# Patient Record
Sex: MALE | Race: WHITE | Employment: FULL TIME | URBAN - METROPOLITAN AREA
[De-identification: names, ages, dates, MRNs, and addresses within clinical notes are randomized per-mention and may not be internally consistent; named-entity substitution may affect disease eponyms.]

---

## 2018-11-08 ENCOUNTER — HOSPITAL ENCOUNTER (OUTPATIENT)
Age: 23
Discharge: HOME | End: 2018-11-08
Payer: MEDICAID

## 2018-11-08 LAB
CHOLESTEROL: 147 MG/DL
GLUCOSE FASTING: 74 MG/DL (ref 70–99)
HDLC SERPL-MCNC: 36 MG/DL
LDL CHOLESTEROL DIRECT: 97 MG/DL
TRIGL SERPL-MCNC: 115 MG/DL

## 2018-11-08 PROCEDURE — 83721 ASSAY OF BLOOD LIPOPROTEIN: CPT

## 2018-11-08 PROCEDURE — 82947 ASSAY GLUCOSE BLOOD QUANT: CPT

## 2018-11-08 PROCEDURE — 36415 COLL VENOUS BLD VENIPUNCTURE: CPT

## 2018-11-08 PROCEDURE — 80061 LIPID PANEL: CPT

## 2019-02-18 ENCOUNTER — HOSPITAL ENCOUNTER (EMERGENCY)
Age: 24
Discharge: PSYCHIATRIC OR SUBSTANCE USE FACILITY | End: 2019-02-19
Attending: EMERGENCY MEDICINE
Payer: MEDICAID

## 2019-02-18 DIAGNOSIS — F22 PARANOID BEHAVIOR (HCC): Primary | ICD-10-CM

## 2019-02-18 DIAGNOSIS — Z86.59 HISTORY OF SCHIZOPHRENIA: ICD-10-CM

## 2019-02-18 LAB
ACETAMINOPHEN LEVEL: <5 UG/ML (ref 15–30)
ALBUMIN SERPL-MCNC: 4 GM/DL (ref 3.4–5)
ALCOHOL SCREEN SERUM: <0.01 %WT/VOL
ALP BLD-CCNC: 68 IU/L (ref 40–129)
ALT SERPL-CCNC: 18 U/L (ref 10–40)
AMPHETAMINES: NEGATIVE
ANION GAP SERPL CALCULATED.3IONS-SCNC: 12 MMOL/L (ref 4–16)
AST SERPL-CCNC: 19 IU/L (ref 15–37)
BACTERIA: NEGATIVE /HPF
BARBITURATE SCREEN URINE: NEGATIVE
BASOPHILS ABSOLUTE: 0.1 K/CU MM
BASOPHILS RELATIVE PERCENT: 0.6 % (ref 0–1)
BENZODIAZEPINE SCREEN, URINE: NEGATIVE
BILIRUB SERPL-MCNC: 0.5 MG/DL (ref 0–1)
BILIRUBIN URINE: NEGATIVE MG/DL
BLOOD, URINE: ABNORMAL
BUN BLDV-MCNC: 9 MG/DL (ref 6–23)
CALCIUM SERPL-MCNC: 8.9 MG/DL (ref 8.3–10.6)
CANNABINOID SCREEN URINE: NEGATIVE
CHLORIDE BLD-SCNC: 101 MMOL/L (ref 99–110)
CLARITY: CLEAR
CO2: 27 MMOL/L (ref 21–32)
COCAINE METABOLITE: NEGATIVE
COLOR: YELLOW
CREAT SERPL-MCNC: 1.2 MG/DL (ref 0.9–1.3)
DIFFERENTIAL TYPE: ABNORMAL
EOSINOPHILS ABSOLUTE: 0.4 K/CU MM
EOSINOPHILS RELATIVE PERCENT: 2.8 % (ref 0–3)
GFR AFRICAN AMERICAN: >60 ML/MIN/1.73M2
GFR NON-AFRICAN AMERICAN: >60 ML/MIN/1.73M2
GLUCOSE BLD-MCNC: 99 MG/DL (ref 70–99)
GLUCOSE, URINE: NEGATIVE MG/DL
HCT VFR BLD CALC: 52.3 % (ref 42–52)
HEMOGLOBIN: 17.3 GM/DL (ref 13.5–18)
IMMATURE NEUTROPHIL %: 0.6 % (ref 0–0.43)
KETONES, URINE: NEGATIVE MG/DL
LEUKOCYTE ESTERASE, URINE: NEGATIVE
LYMPHOCYTES ABSOLUTE: 3.6 K/CU MM
LYMPHOCYTES RELATIVE PERCENT: 26.2 % (ref 24–44)
MCH RBC QN AUTO: 33.4 PG (ref 27–31)
MCHC RBC AUTO-ENTMCNC: 33.1 % (ref 32–36)
MCV RBC AUTO: 101 FL (ref 78–100)
MONOCYTES ABSOLUTE: 1 K/CU MM
MONOCYTES RELATIVE PERCENT: 7.3 % (ref 0–4)
MUCUS: ABNORMAL HPF
NITRITE URINE, QUANTITATIVE: NEGATIVE
NUCLEATED RBC %: 0 %
OPIATES, URINE: NEGATIVE
OXYCODONE: NEGATIVE
PDW BLD-RTO: 12.8 % (ref 11.7–14.9)
PH, URINE: 7 (ref 5–8)
PHENCYCLIDINE, URINE: NEGATIVE
PLATELET # BLD: 317 K/CU MM (ref 140–440)
PMV BLD AUTO: 9.8 FL (ref 7.5–11.1)
POTASSIUM SERPL-SCNC: 4.1 MMOL/L (ref 3.5–5.1)
PROTEIN UA: NEGATIVE MG/DL
RBC # BLD: 5.18 M/CU MM (ref 4.6–6.2)
RBC URINE: <1 /HPF (ref 0–3)
SALICYLATE LEVEL: <0.3 MG/DL (ref 15–30)
SEGMENTED NEUTROPHILS ABSOLUTE COUNT: 8.7 K/CU MM
SEGMENTED NEUTROPHILS RELATIVE PERCENT: 62.5 % (ref 36–66)
SODIUM BLD-SCNC: 140 MMOL/L (ref 135–145)
SPECIFIC GRAVITY UA: 1.01 (ref 1–1.03)
TOTAL IMMATURE NEUTOROPHIL: 0.09 K/CU MM
TOTAL NUCLEATED RBC: 0 K/CU MM
TOTAL PROTEIN: 7.1 GM/DL (ref 6.4–8.2)
TRICHOMONAS: ABNORMAL /HPF
TSH HIGH SENSITIVITY: 1.93 UIU/ML (ref 0.27–4.2)
UROBILINOGEN, URINE: 1 MG/DL (ref 0.2–1)
WBC # BLD: 13.9 K/CU MM (ref 4–10.5)
WBC UA: <1 /HPF (ref 0–2)

## 2019-02-18 PROCEDURE — G0480 DRUG TEST DEF 1-7 CLASSES: HCPCS

## 2019-02-18 PROCEDURE — 36415 COLL VENOUS BLD VENIPUNCTURE: CPT

## 2019-02-18 PROCEDURE — 99285 EMERGENCY DEPT VISIT HI MDM: CPT

## 2019-02-18 PROCEDURE — 81001 URINALYSIS AUTO W/SCOPE: CPT

## 2019-02-18 PROCEDURE — 80307 DRUG TEST PRSMV CHEM ANLYZR: CPT

## 2019-02-18 PROCEDURE — 85025 COMPLETE CBC W/AUTO DIFF WBC: CPT

## 2019-02-18 PROCEDURE — 84443 ASSAY THYROID STIM HORMONE: CPT

## 2019-02-18 PROCEDURE — 80053 COMPREHEN METABOLIC PANEL: CPT

## 2019-02-18 ASSESSMENT — PAIN DESCRIPTION - PAIN TYPE: TYPE: CHRONIC PAIN

## 2019-02-18 ASSESSMENT — PAIN DESCRIPTION - LOCATION: LOCATION: BACK

## 2019-02-18 ASSESSMENT — PAIN SCALES - GENERAL: PAINLEVEL_OUTOF10: 4

## 2019-02-19 VITALS
WEIGHT: 240 LBS | HEIGHT: 67 IN | SYSTOLIC BLOOD PRESSURE: 140 MMHG | RESPIRATION RATE: 16 BRPM | DIASTOLIC BLOOD PRESSURE: 75 MMHG | TEMPERATURE: 98.1 F | BODY MASS INDEX: 37.67 KG/M2 | OXYGEN SATURATION: 98 % | HEART RATE: 91 BPM

## 2019-02-19 ASSESSMENT — SLEEP AND FATIGUE QUESTIONNAIRES
DO YOU USE A SLEEP AID: COMMENT
AVERAGE NUMBER OF SLEEP HOURS: 5
DIFFICULTY STAYING ASLEEP: YES
DIFFICULTY ARISING: NO
SLEEP PATTERN: DIFFICULTY FALLING ASLEEP
DO YOU HAVE DIFFICULTY SLEEPING: YES
DIFFICULTY FALLING ASLEEP: NO
RESTFUL SLEEP: YES

## 2019-02-19 ASSESSMENT — PATIENT HEALTH QUESTIONNAIRE - PHQ9: SUM OF ALL RESPONSES TO PHQ QUESTIONS 1-9: 6

## 2020-03-12 ENCOUNTER — APPOINTMENT (OUTPATIENT)
Dept: GENERAL RADIOLOGY | Age: 25
End: 2020-03-12
Payer: MEDICAID

## 2020-03-12 ENCOUNTER — HOSPITAL ENCOUNTER (EMERGENCY)
Age: 25
Discharge: HOME | End: 2020-03-12
Attending: EMERGENCY MEDICINE
Payer: MEDICAID

## 2020-03-12 VITALS
OXYGEN SATURATION: 98 % | HEART RATE: 85 BPM | BODY MASS INDEX: 36.88 KG/M2 | SYSTOLIC BLOOD PRESSURE: 130 MMHG | TEMPERATURE: 98.2 F | RESPIRATION RATE: 15 BRPM | HEIGHT: 69 IN | WEIGHT: 249 LBS | DIASTOLIC BLOOD PRESSURE: 86 MMHG

## 2020-03-12 LAB
ACETAMINOPHEN LEVEL: <5 UG/ML (ref 15–30)
ALBUMIN SERPL-MCNC: 4.7 GM/DL (ref 3.4–5)
ALCOHOL SCREEN SERUM: NORMAL %WT/VOL
ALP BLD-CCNC: 81 IU/L (ref 40–129)
ALT SERPL-CCNC: 22 U/L (ref 10–40)
AMPHETAMINES: NEGATIVE
ANION GAP SERPL CALCULATED.3IONS-SCNC: 11 MMOL/L (ref 4–16)
AST SERPL-CCNC: 18 IU/L (ref 15–37)
BACTERIA: NEGATIVE /HPF
BARBITURATE SCREEN URINE: NEGATIVE
BASOPHILS ABSOLUTE: 0.1 K/CU MM
BASOPHILS RELATIVE PERCENT: 0.5 % (ref 0–1)
BENZODIAZEPINE SCREEN, URINE: NEGATIVE
BILIRUB SERPL-MCNC: 0.5 MG/DL (ref 0–1)
BILIRUBIN URINE: NEGATIVE MG/DL
BLOOD, URINE: ABNORMAL
BUN BLDV-MCNC: 10 MG/DL (ref 6–23)
CALCIUM SERPL-MCNC: 9.9 MG/DL (ref 8.3–10.6)
CANNABINOID SCREEN URINE: NEGATIVE
CHLORIDE BLD-SCNC: 101 MMOL/L (ref 99–110)
CLARITY: ABNORMAL
CO2: 26 MMOL/L (ref 21–32)
COCAINE METABOLITE: NEGATIVE
COLOR: ABNORMAL
CREAT SERPL-MCNC: 1 MG/DL (ref 0.9–1.3)
DIFFERENTIAL TYPE: ABNORMAL
DOSE AMOUNT: ABNORMAL
DOSE AMOUNT: ABNORMAL
DOSE TIME: ABNORMAL
DOSE TIME: ABNORMAL
EOSINOPHILS ABSOLUTE: 0.2 K/CU MM
EOSINOPHILS RELATIVE PERCENT: 0.9 % (ref 0–3)
GFR AFRICAN AMERICAN: >60 ML/MIN/1.73M2
GFR NON-AFRICAN AMERICAN: >60 ML/MIN/1.73M2
GLUCOSE BLD-MCNC: 97 MG/DL (ref 70–99)
GLUCOSE, URINE: NEGATIVE MG/DL
HCT VFR BLD CALC: 55 % (ref 42–52)
HEMOGLOBIN: 18.4 GM/DL (ref 13.5–18)
IMMATURE NEUTROPHIL %: 0.4 % (ref 0–0.43)
KETONES, URINE: ABNORMAL MG/DL
LEUKOCYTE ESTERASE, URINE: NEGATIVE
LYMPHOCYTES ABSOLUTE: 2.2 K/CU MM
LYMPHOCYTES RELATIVE PERCENT: 14 % (ref 24–44)
MCH RBC QN AUTO: 32.7 PG (ref 27–31)
MCHC RBC AUTO-ENTMCNC: 33.5 % (ref 32–36)
MCV RBC AUTO: 97.9 FL (ref 78–100)
MONOCYTES ABSOLUTE: 0.7 K/CU MM
MONOCYTES RELATIVE PERCENT: 4.6 % (ref 0–4)
MUCUS: ABNORMAL HPF
NITRITE URINE, QUANTITATIVE: NEGATIVE
NUCLEATED RBC %: 0 %
OPIATES, URINE: NEGATIVE
OXYCODONE: NORMAL
PDW BLD-RTO: 12.5 % (ref 11.7–14.9)
PH, URINE: 5 (ref 5–8)
PHENCYCLIDINE, URINE: NEGATIVE
PLATELET # BLD: 401 K/CU MM (ref 140–440)
PMV BLD AUTO: 10.1 FL (ref 7.5–11.1)
POTASSIUM SERPL-SCNC: 4.9 MMOL/L (ref 3.5–5.1)
PROTEIN UA: 30 MG/DL
RBC # BLD: 5.62 M/CU MM (ref 4.6–6.2)
RBC URINE: 6 /HPF (ref 0–3)
SALICYLATE LEVEL: <0.3 MG/DL (ref 15–30)
SEGMENTED NEUTROPHILS ABSOLUTE COUNT: 12.7 K/CU MM
SEGMENTED NEUTROPHILS RELATIVE PERCENT: 79.6 % (ref 36–66)
SODIUM BLD-SCNC: 138 MMOL/L (ref 135–145)
SPECIFIC GRAVITY UA: 1.03 (ref 1–1.03)
SQUAMOUS EPITHELIAL: <1 /HPF
TOTAL IMMATURE NEUTOROPHIL: 0.07 K/CU MM
TOTAL NUCLEATED RBC: 0 K/CU MM
TOTAL PROTEIN: 8.1 GM/DL (ref 6.4–8.2)
TRANSITIONAL EPITHELIAL: <1 /HPF
TRICHOMONAS: ABNORMAL /HPF
UROBILINOGEN, URINE: 1 MG/DL (ref 0.2–1)
WBC # BLD: 16 K/CU MM (ref 4–10.5)
WBC UA: 1 /HPF (ref 0–2)

## 2020-03-12 PROCEDURE — 81001 URINALYSIS AUTO W/SCOPE: CPT

## 2020-03-12 PROCEDURE — 80053 COMPREHEN METABOLIC PANEL: CPT

## 2020-03-12 PROCEDURE — 93005 ELECTROCARDIOGRAM TRACING: CPT | Performed by: EMERGENCY MEDICINE

## 2020-03-12 PROCEDURE — 99285 EMERGENCY DEPT VISIT HI MDM: CPT

## 2020-03-12 PROCEDURE — 85025 COMPLETE CBC W/AUTO DIFF WBC: CPT

## 2020-03-12 PROCEDURE — 71045 X-RAY EXAM CHEST 1 VIEW: CPT

## 2020-03-12 PROCEDURE — G0480 DRUG TEST DEF 1-7 CLASSES: HCPCS

## 2020-03-12 PROCEDURE — 80307 DRUG TEST PRSMV CHEM ANLYZR: CPT

## 2020-03-12 ASSESSMENT — ENCOUNTER SYMPTOMS
EYE DISCHARGE: 0
EYE PAIN: 0
NAUSEA: 0
VOMITING: 0
SORE THROAT: 0
BACK PAIN: 0
RHINORRHEA: 0
ABDOMINAL PAIN: 0
COUGH: 0
SHORTNESS OF BREATH: 0

## 2020-03-12 NOTE — ED TRIAGE NOTES
Pt stated he has had a history of mental health problems. Was on medications for it but stopped taking them awhile ago. He is having increased paranoid thoughts at this time.

## 2020-03-12 NOTE — ED NOTES
The pt stated, \"Now that I am out of the situation I was in, I feel much better. I feel a lot more comfortable and I don't feel scared anymore. \"     Galo Cargo  03/12/20 4294

## 2020-03-12 NOTE — ED PROVIDER NOTES
Genitourinary: Negative for dysuria and flank pain. Musculoskeletal: Negative for back pain and neck pain. Skin: Negative for pallor and wound. Neurological: Negative for dizziness, seizures, facial asymmetry, light-headedness, numbness and headaches. Psychiatric/Behavioral: Positive for hallucinations. Negative for confusion. The patient is nervous/anxious. Except as noted above the remainder of the review of systems was reviewed and negative. PAST MEDICAL HISTORY     Past Medical History:   Diagnosis Date    Schizo affective schizophrenia [facility])        Prior to Admission medications    Medication Sig Start Date End Date Taking? Authorizing Provider   ibuprofen (ADVIL;MOTRIN) 600 MG tablet Take 1 tablet by mouth every 6 hours as needed for Pain 12/4/15   Yovanny Spring,    acetaminophen-codeine (TYLENOL/CODEINE #3) 300-30 MG per tablet Take 1 tablet by mouth every 4 hours as needed for 20 doses. 2/23/13   Desi Talavera, DO        There is no problem list on file for this patient. SURGICAL HISTORY     History reviewed. No pertinent surgical history. CURRENT MEDICATIONS       Previous Medications    ACETAMINOPHEN-CODEINE (TYLENOL/CODEINE #3) 300-30 MG PER TABLET    Take 1 tablet by mouth every 4 hours as needed for 20 doses. IBUPROFEN (ADVIL;MOTRIN) 600 MG TABLET    Take 1 tablet by mouth every 6 hours as needed for Pain       ALLERGIES     Patient has no known allergies. FAMILY HISTORY     History reviewed. No pertinent family history.        SOCIAL HISTORY       Social History     Socioeconomic History    Marital status: Single     Spouse name: None    Number of children: None    Years of education: None    Highest education level: None   Occupational History    None   Social Needs    Financial resource strain: None    Food insecurity     Worry: None     Inability: None    Transportation needs     Medical: None     Non-medical: None   Tobacco Use    Smoking Chest wall: No tenderness. Abdominal:      Palpations: Abdomen is soft. Tenderness: There is no abdominal tenderness. There is no guarding. Comments: Abdomen soft, non-tender, non-peritoneal  No guarding on abdominal exam   Musculoskeletal:         General: No signs of injury. Right lower leg: No edema. Left lower leg: No edema. Lymphadenopathy:      Cervical: No cervical adenopathy. Skin:     General: Skin is warm. Capillary Refill: Capillary refill takes less than 2 seconds. Findings: No erythema, lesion or rash. Neurological:      General: No focal deficit present. Mental Status: He is alert and oriented to person, place, and time. Mental status is at baseline.          DIAGNOSTIC RESULTS     Labs Reviewed   CBC WITH AUTO DIFFERENTIAL - Abnormal; Notable for the following components:       Result Value    WBC 16.0 (*)     Hemoglobin 18.4 (*)     Hematocrit 55.0 (*)     MCH 32.7 (*)     Segs Relative 79.6 (*)     Lymphocytes % 14.0 (*)     Monocytes % 4.6 (*)     All other components within normal limits   URINALYSIS - Abnormal; Notable for the following components:    Color, UA TONI (*)     Clarity, UA HAZY (*)     Ketones, Urine SMALL (*)     Blood, Urine MODERATE (*)     Protein, UA 30 (*)     RBC, UA 6 (*)     Mucus, UA MODERATE (*)     All other components within normal limits   SALICYLATE LEVEL - Abnormal; Notable for the following components:    Salicylate Lvl <4.1 (*)     All other components within normal limits   ACETAMINOPHEN LEVEL - Abnormal; Notable for the following components:    Acetaminophen Level <5.0 (*)     All other components within normal limits   COMPREHENSIVE METABOLIC PANEL W/ REFLEX TO MG FOR LOW K   URINE DRUG SCREEN   ETHANOL          EKG: All EKG's are interpreted by the Emergency Department Physician who either signs or Co-signs this chart in the absence of a cardiologist.       EKG Interpretation    Interpreted by emergency department other than the patient: yes          CONSULTS:  IP CONSULT TO PSYCHOLOGY    PROCEDURES:  None performed unless otherwise noted below     Procedures        FINAL IMPRESSION      1. Delusions ([address]    2. Hematuria, unspecified type          DISPOSITION/PLAN   DISPOSITION        PATIENT REFERRED TO:  No follow-up provider specified. DISCHARGE MEDICATIONS:  New Prescriptions    No medications on file       ED Provider Disposition Time  DISPOSITION        The Patient was instructed to read the package inserts with any medication that was prescribed. Major potential reactions and medication interactions were discussed. The Patient understands that there are numerous possible adverse reactions not covered. The patient was also instructed to arrange follow-up with his or her primary care provider for review of any pending labwork or incidental findings on any radiology results that were obtained. All efforts were made to discuss any incidental findings that require further monitoring. Controlled Substances Monitoring:     No flowsheet data found.     (Please note that portions of this note were completed with a voice recognition program.  Efforts were made to edit the dictations but occasionally words are mis-transcribed.)    James Bui MD (electronically signed)  Attending Emergency Physician           James Bui MD  03/12/20 2792

## 2020-03-12 NOTE — ED NOTES
Pt sitting on side of bed. No acute distress noted. Green gown remains in place, 1:1 continuous monitoring maintained. Sitter at the bedside.  Will continue to monitor     Acosta Shay RN  03/12/20 8906

## 2020-03-12 NOTE — ED PROVIDER NOTES
Emergency Department Encounter  Location: [address] [place] [address]    Patient: Emma Cabrera  MRN: [id number]  : 1995  Date of evaluation: 3/12/2020  ED Provider: Norma Clement MD    Emma Cabrera was checked out to me by Dr. Obi Plummer. Please see his/her initial documentation for details of the patient's initial ED presentation, physical exam and completed studies. In brief, Emma Cabrera is a 27 y.o. male that presented to the emergency department with increased hallucinations after being off his medications. No medical sxs at this time. No SI or HI.      I have reviewed and interpreted all of the currently available lab results and diagnostics from this visit:  Results for orders placed or performed during the hospital encounter of 20   Comprehensive Metabolic Panel w/ Reflex to MG   Result Value Ref Range    Sodium 138 135 - 145 MMOL/L    Potassium 4.9 3.5 - 5.1 MMOL/L    Chloride 101 99 - 110 mMol/L    CO2 26 21 - 32 MMOL/L    BUN 10 6 - 23 MG/DL    CREATININE 1.0 0.9 - 1.3 MG/DL    Glucose 97 70 - 99 MG/DL    Calcium 9.9 8.3 - 10.6 MG/DL    Alb 4.7 3.4 - 5.0 GM/DL    Total Protein 8.1 6.4 - 8.2 GM/DL    Total Bilirubin 0.5 0.0 - 1.0 MG/DL    ALT 22 10 - 40 U/L    AST 18 15 - 37 IU/L    Alkaline Phosphatase 81 40 - 129 IU/L    GFR Non-African American >60 >60 mL/min/1.73m2    GFR African American >60 >60 mL/min/1.73m2    Anion Gap 11 4 - 16   CBC Auto Differential   Result Value Ref Range    WBC 16.0 (H) 4.0 - 10.5 K/CU MM    RBC 5.62 4.6 - 6.2 M/CU MM    Hemoglobin 18.4 (H) 13.5 - 18.0 GM/DL    Hematocrit 55.0 (H) 42 - 52 %    MCV 97.9 78 - 100 FL    MCH 32.7 (H) 27 - 31 PG    MCHC 33.5 32.0 - 36.0 %    RDW 12.5 11.7 - 14.9 %    Platelets 664 969 - 217 K/CU MM    MPV 10.1 7.5 - 11.1 FL    Differential Type AUTOMATED DIFFERENTIAL     Segs Relative 79.6 (H) 36 - 66 %    Lymphocytes % 14.0 (L) 24 - 44 %    Monocytes % 4.6 (H) 0 - 4 % Eosinophils % 0.9 0 - 3 %    Basophils % 0.5 0 - 1 %    Segs Absolute 12.7 K/CU MM    Lymphocytes Absolute 2.2 K/CU MM    Monocytes Absolute 0.7 K/CU MM    Eosinophils Absolute 0.2 K/CU MM    Basophils Absolute 0.1 K/CU MM    Nucleated RBC % 0.0 %    Total Nucleated RBC 0.0 K/CU MM    Total Immature Neutrophil 0.07 K/CU MM    Immature Neutrophil % 0.4 0 - 0.43 %   Urinalysis   Result Value Ref Range    Color, UA TONI (A) YELLOW    Clarity, UA HAZY (A) CLEAR    Glucose, Urine NEGATIVE NEGATIVE MG/DL    Bilirubin Urine NEGATIVE NEGATIVE MG/DL    Ketones, Urine SMALL (A) NEGATIVE MG/DL    Specific Gravity, UA 1.029 1.001 - 1.035    Blood, Urine MODERATE (A) NEGATIVE    pH, Urine 5.0 5.0 - 8.0    Protein, UA 30 (A) NEGATIVE MG/DL    Urobilinogen, Urine 1 0.2 - 1.0 MG/DL    Nitrite Urine, Quantitative NEGATIVE NEGATIVE    Leukocyte Esterase, Urine NEGATIVE NEGATIVE    RBC, UA 6 (H) 0 - 3 /HPF    WBC, UA 1 0 - 2 /HPF    Bacteria, UA NEGATIVE NEGATIVE /HPF    Squam Epithel, UA <1 /HPF    Trans Epithel, UA <1 /HPF    Mucus, UA MODERATE (A) NEGATIVE HPF    Trichomonas, UA NONE SEEN NONE SEEN /HPF   Drug screen multi urine   Result Value Ref Range    Cannabinoid Scrn, Ur NEGATIVE NEGATIVE    Amphetamines NEGATIVE NEGATIVE    Cocaine Metabolite NEGATIVE NEGATIVE    Benzodiazepine Screen, Urine NEGATIVE NEGATIVE    Barbiturate Screen, Ur NEGATIVE NEGATIVE    Opiates, Urine NEGATIVE NEGATIVE    Phencyclidine, Urine NEGATIVE NEGATIVE    Oxycodone  NEGATIVE     NEGATIVE          THRESHOLD CONCENTRATIONS (mg/dL)  AMPHT               1000  PATY,OPIA             300  BZO,BAR              200  PCP                   25  THC                   50  OXY                  100          IF POSITIVE, SPECIMEN WILL BE  DISCARDED AFTER 6 MONTHS. CALL LAB IF CONFIRMATION NEEDED. ALL NEGATIVE SPECIMENS WILL BE  DISCARDED AFTER ONE WEEK. * UNCONFIRMED POSITIVES MAY  NOT MEET FORENSIC REQUIREMENTS.              Salicylate Level   Result Value Ref to bring the patient back if she notices any worsening. Patient is organized at this time and safe for discharge. ED Medication Orders (From admission, onward)    None          Final Impression      1. Delusions ([address]    2.  Hematuria, unspecified type        DISPOSITION Decision To Discharge 03/12/2020 07:51:32 PM     (Please note that portions of this note may have been completed with a voice recognition program. Efforts were made to edit the dictations but occasionally words are mis-transcribed.)    Dulce Osborne MD  [id number] Jai Brewster MD  03/12/20 0272

## 2020-03-13 ENCOUNTER — HOSPITAL ENCOUNTER (EMERGENCY)
Age: 25
Discharge: HOME | End: 2020-03-13
Attending: EMERGENCY MEDICINE
Payer: MEDICAID

## 2020-03-13 VITALS
HEART RATE: 118 BPM | SYSTOLIC BLOOD PRESSURE: 151 MMHG | DIASTOLIC BLOOD PRESSURE: 97 MMHG | OXYGEN SATURATION: 95 % | RESPIRATION RATE: 18 BRPM | TEMPERATURE: 98.4 F | BODY MASS INDEX: 35.55 KG/M2 | WEIGHT: 240 LBS | HEIGHT: 69 IN

## 2020-03-13 PROCEDURE — 99284 EMERGENCY DEPT VISIT MOD MDM: CPT

## 2020-03-13 PROCEDURE — 93010 ELECTROCARDIOGRAM REPORT: CPT | Performed by: INTERNAL MEDICINE

## 2020-03-13 NOTE — ED NOTES
Discharge instructions reviewed. All questions answered to pt and family satisfaction. Pt alert, oriented, and ambulatory upon discharge.       Rae Grover RN  03/12/20 2045

## 2020-03-13 NOTE — ED PROVIDER NOTES
EMERGENCY DEPARTMENT ENCOUNTER      PCP: Meaghan Dowell MD    CHIEF COMPLAINT    Chief Complaint   Patient presents with    Paranoid       Of note, this patient was also evaluated by the attending physician, Dr. Loraine Portillo. HPI    Haven Swanson is a 27 y.o. male Pt reports being paranoid and was in ED yesterday for same thing and was advised to follow up outpatient. Pt states thinking thoughts would improve however felt worse and was over-thinking all night unable to sleep. Denies suicidal or homicidal ideation. He said that he just feels nervous about going to mental health. He states he wants to get back on his medication. REVIEW OF SYSTEMS    Constitutional:  Denies fever, chills, weight loss or weakness   HENT:  Denies sore throat or ear pain   Cardiovascular:  Denies chest pain, palpitations   Respiratory:  Denies cough or shortness of breath    GI:  Denies abdominal pain, nausea, vomiting, or diarrhea  :  Denies any urinary symptoms  Skin:  Denies rash  Neurologic:  Denies headache, focal weakness or sensory changes   Endocrine:  Denies polyuria or polydypsia   Lymphatic:  Denies swollen glands     All other review of systems are negative  See HPI and nursing notes for additional information     PAST MEDICAL AND SURGICAL HISTORY    Past Medical History:   Diagnosis Date    Schizo affective schizophrenia ([facility]      History reviewed. No pertinent surgical history. CURRENT MEDICATIONS    Current Outpatient Rx   Medication Sig Dispense Refill    ibuprofen (ADVIL;MOTRIN) 600 MG tablet Take 1 tablet by mouth every 6 hours as needed for Pain 30 tablet 0    acetaminophen-codeine (TYLENOL/CODEINE #3) 300-30 MG per tablet Take 1 tablet by mouth every 4 hours as needed for 20 doses.  20 tablet 0       ALLERGIES    No Known Allergies    SOCIAL AND FAMILY HISTORY    Social History     Socioeconomic History    Marital status: Single     Spouse name: None    Number of children: None    Years of lower extremities  Bilateral upper and lower extremity ROM intact without pain or obvious deficit  Integument:  Warm, Dry  Neurologic: Alert & oriented , No focal deficits noted. Cranial nerves II through XII grossly intact. Normal gross motor coordination & motor strength bilateral upper and lower extremities  Sensation intact. Psychiatric:  Affect normal, Mood normal.       Labs:        EKG        RADIOLOGY            ED COURSE & MEDICAL DECISION MAKING       Patient remained in stable condition throughout his ED course. I discussed the case with Dr. Marcelle Sargent who seen the patient yesterday for the same thing she agreed with therapeutic modalities and discharge plan. Patient expressed no suicidal ideation or homicidal ideation his mood was calm and appropriate. He will be discharged at this time, to follow-up as scheduled at 830 this morning [facility]. I discussed discharge plans with the patient he understood and agreed. Patient agrees to return emergency department if symptoms worsen or any new symptoms develop. Vital signs and nursing notes reviewed during ED course. Clinical  IMPRESSION    1. Paranoia ([address]              Comment: Please note this report has been produced using speech recognition software and may contain errors related to that system including errors in grammar, punctuation, and spelling, as well as words and phrases that may be inappropriate. If there are any questions or concerns please feel free to contact the dictating provider for clarification.           Mohit Oneill [address] [place] [address]  03/13/20 9679

## 2020-03-13 NOTE — ED NOTES
Security called to return belongings at this time        Santos Tong, [facility]  03/12/20 2017

## 2020-03-13 NOTE — ED TRIAGE NOTES
Pt reports being paranoid and was in ED yesterday for same thing and was advised to follow up outpatient. Pt states thinking thoughts would improve however felt worse and was over-thinking all night unable to sleep.

## 2020-03-13 NOTE — ED PROVIDER NOTES
I independently examined and evaluated Ami Lilly. In brief, 22-year-old male with history of schizoaffective disorder and schizophrenia presents reporting paranoia. He was seen yesterday in the emergency department and was seen by mental health crisis team.  He was deemed safe for discharge home. He does have an appointment with [facility] mental health this morning. He states he did not sleep well this evening began feeling a little worse so comes emergency department for evaluation. He actively denies any SI, HI or AVH. No physical complaints. He is able to contract for safety at this time. .    Focused exam revealed physical otologic exam is nonfocal..    ED course: Patient does not have any active SI or HI. He does have an appointment in about an hour at [facility]. We did discuss disposition options. He is comfortable at this time with discharge to his appointment. I do feel this is reasonable given that he was just seen 1 day ago he does not identify any significantly worsened symptoms. All diagnostic, treatment, and disposition decisions were made by myself in conjunction with the advanced practice provider. For all further details of the patient's emergency department visit, please see the advanced practice provider's documentation. Comment: Please note this report has been produced using speech recognition software and may contain errors related to that system including errors in grammar, punctuation, and spelling, as well as words and phrases that may be inappropriate. If there are any questions or concerns please feel free to contact the dictating provider for clarification.        Arbutus Essex, MD  03/13/20 2798

## 2020-03-13 NOTE — ED NOTES
Provisional Diagnosis: Paranoid thoughts; History of Paranoid Schizophrenia    Psychosocial and Contextual Factors: Pt presents to the ED for paranoid thoughts. Pt states that he sitting in his sisters house and felt that the people outside the door talking were talking about him. Pt states that he is feeling better now, and that the thoughts have subsided. Pt states that he was originally seeing Dr. Ronak Irwin at [facility] [place], seeing a counselor, and taking medications. Pt states that last appointment with Ronak Irwin was in late November, that he stopped seeing his counselor, and that he is no longer taking his medications. When asked why he stopped taking his medications, pt states that he felt that they weren't working at that time, and that they made him feel \"dopy. \" Pt states that he realizes now that this was a mistake, and that the medications take time to take effect and for the \"dopiness\" to wear off. Pt states that he wants to go back to Dr. Ronak Irwin and get started back on his medications, and start seeing a counselor again. Pt states that this want is influenced by him wanting to become employed again and get his own place to stay. Pt states that he is currently staying with his sister until he is \"back on [his] feet. \" Pt states that he is currently applying for a job at different locations. During interview, pt speech was rapid, eye contact was good. Pt denies SI/HI. Pt denies wanting to hurt self. Pt states intent to follow up with [facility] tomorrow morning  Pt's sister agrees to keep eyes on pt for the next 24hrs. Pt agrees to tell sister if he has thoughts suicide or homicide. Pt agrees with following up with [facility] tomorrow morning. Pt has good support system in place. Pt has hope and plans for future. After consulting with Dr. Anna Boss, pt is to be d/c with sister and will follow up with [facility] in the morning.       Current MH Treatment: Pt not currently taking medication nor following up with [facility]    Patient MH History: Paranoid Schizophrenia     Patient Family MH History: Pt dneis      Present Suicidal Behavior: Pt denies    Access to Weapons: Pt denies    Past Suicidal Behavior:       Attempt: 2018 OD    Self-Injurious/Self-Mutilation: Pt denies    Traumatic Event Within Past 2 Weeks: Pt denies    Current Abuse: Pt denies    Past Abuse: Pt deneis    Legal: Pt denies    Violence: Pt denies    Protective Factors: Pt has plans for future; Pt not HI/SI; Pt able to come up with safety plan; Pt admits by himself that not taking medications was a mistake and that he needs to follow up with mental health; Good support system    Risk Factors: Pt not currently taking medications; Paranoia    Housing: Currently resides with sister    Clinical Summary: Pt presents with paranoid thoughts. Pt not currently taking medications. Pt states that he wishes to see [facility] and get back on medications and continue his counseling. Pt to be discharged with safety plan and follow up with [facility]      Level of Care Disposition:      Patient is medically cleared by Dr. Carmen Muller. Consulted with Dr. Carmen Muller about plan of care moving forward.    Patient to be discharged with safety plan and follow up with Eligio Parra [address], ALEJANDRO  03/12/20 2026

## 2020-03-18 LAB
EKG ATRIAL RATE: 94 BPM
EKG DIAGNOSIS: NORMAL
EKG P AXIS: 34 DEGREES
EKG P-R INTERVAL: 126 MS
EKG Q-T INTERVAL: 356 MS
EKG QRS DURATION: 86 MS
EKG QTC CALCULATION (BAZETT): 445 MS
EKG R AXIS: 4 DEGREES
EKG T AXIS: 8 DEGREES
EKG VENTRICULAR RATE: 94 BPM

## 2020-03-20 ENCOUNTER — HOSPITAL ENCOUNTER (EMERGENCY)
Age: 25
Discharge: PSYCHIATRIC OR SUBSTANCE USE FACILITY | End: 2020-03-20
Attending: EMERGENCY MEDICINE
Payer: MEDICAID

## 2020-03-20 VITALS
HEIGHT: 68 IN | WEIGHT: 250 LBS | RESPIRATION RATE: 16 BRPM | TEMPERATURE: 98.6 F | OXYGEN SATURATION: 97 % | BODY MASS INDEX: 37.89 KG/M2 | HEART RATE: 75 BPM | DIASTOLIC BLOOD PRESSURE: 78 MMHG | SYSTOLIC BLOOD PRESSURE: 125 MMHG

## 2020-03-20 LAB
ACETAMINOPHEN LEVEL: <5 UG/ML (ref 15–30)
ALBUMIN SERPL-MCNC: 4.1 GM/DL (ref 3.4–5)
ALCOHOL SCREEN SERUM: NORMAL %WT/VOL
ALP BLD-CCNC: 70 IU/L (ref 40–129)
ALT SERPL-CCNC: 18 U/L (ref 10–40)
AMPHETAMINES: NEGATIVE
ANION GAP SERPL CALCULATED.3IONS-SCNC: 12 MMOL/L (ref 4–16)
AST SERPL-CCNC: 17 IU/L (ref 15–37)
BACTERIA: NEGATIVE /HPF
BARBITURATE SCREEN URINE: NEGATIVE
BASOPHILS ABSOLUTE: 0.1 K/CU MM
BASOPHILS RELATIVE PERCENT: 0.4 % (ref 0–1)
BENZODIAZEPINE SCREEN, URINE: NEGATIVE
BILIRUB SERPL-MCNC: 0.2 MG/DL (ref 0–1)
BILIRUBIN URINE: NEGATIVE MG/DL
BLOOD, URINE: ABNORMAL
BUN BLDV-MCNC: 9 MG/DL (ref 6–23)
CALCIUM SERPL-MCNC: 9.3 MG/DL (ref 8.3–10.6)
CANNABINOID SCREEN URINE: NEGATIVE
CHLORIDE BLD-SCNC: 100 MMOL/L (ref 99–110)
CLARITY: CLEAR
CO2: 22 MMOL/L (ref 21–32)
COCAINE METABOLITE: NEGATIVE
COLOR: YELLOW
CREAT SERPL-MCNC: 0.9 MG/DL (ref 0.9–1.3)
DIFFERENTIAL TYPE: ABNORMAL
DOSE AMOUNT: ABNORMAL
DOSE AMOUNT: ABNORMAL
DOSE TIME: ABNORMAL
DOSE TIME: ABNORMAL
EOSINOPHILS ABSOLUTE: 0.4 K/CU MM
EOSINOPHILS RELATIVE PERCENT: 2.3 % (ref 0–3)
GFR AFRICAN AMERICAN: >60 ML/MIN/1.73M2
GFR NON-AFRICAN AMERICAN: >60 ML/MIN/1.73M2
GLUCOSE BLD-MCNC: 112 MG/DL (ref 70–99)
GLUCOSE, URINE: NEGATIVE MG/DL
HCT VFR BLD CALC: 53.3 % (ref 42–52)
HEMOGLOBIN: 17.5 GM/DL (ref 13.5–18)
IMMATURE NEUTROPHIL %: 0.6 % (ref 0–0.43)
KETONES, URINE: NEGATIVE MG/DL
LEUKOCYTE ESTERASE, URINE: NEGATIVE
LYMPHOCYTES ABSOLUTE: 3.6 K/CU MM
LYMPHOCYTES RELATIVE PERCENT: 22.8 % (ref 24–44)
MCH RBC QN AUTO: 32.3 PG (ref 27–31)
MCHC RBC AUTO-ENTMCNC: 32.8 % (ref 32–36)
MCV RBC AUTO: 98.5 FL (ref 78–100)
MONOCYTES ABSOLUTE: 1.1 K/CU MM
MONOCYTES RELATIVE PERCENT: 6.8 % (ref 0–4)
MUCUS: ABNORMAL HPF
NITRITE URINE, QUANTITATIVE: NEGATIVE
NUCLEATED RBC %: 0 %
OPIATES, URINE: NEGATIVE
OXYCODONE: NORMAL
PDW BLD-RTO: 12.7 % (ref 11.7–14.9)
PH, URINE: 5 (ref 5–8)
PHENCYCLIDINE, URINE: NEGATIVE
PLATELET # BLD: 384 K/CU MM (ref 140–440)
PMV BLD AUTO: 10.4 FL (ref 7.5–11.1)
POTASSIUM SERPL-SCNC: 4.1 MMOL/L (ref 3.5–5.1)
PROTEIN UA: NEGATIVE MG/DL
RBC # BLD: 5.41 M/CU MM (ref 4.6–6.2)
RBC URINE: 1 /HPF (ref 0–3)
SALICYLATE LEVEL: <0.3 MG/DL (ref 15–30)
SEGMENTED NEUTROPHILS ABSOLUTE COUNT: 10.7 K/CU MM
SEGMENTED NEUTROPHILS RELATIVE PERCENT: 67.1 % (ref 36–66)
SODIUM BLD-SCNC: 134 MMOL/L (ref 135–145)
SPECIFIC GRAVITY UA: 1.02 (ref 1–1.03)
TOTAL IMMATURE NEUTOROPHIL: 0.09 K/CU MM
TOTAL NUCLEATED RBC: 0 K/CU MM
TOTAL PROTEIN: 7.7 GM/DL (ref 6.4–8.2)
TRICHOMONAS: ABNORMAL /HPF
UROBILINOGEN, URINE: NORMAL MG/DL (ref 0.2–1)
WBC # BLD: 16 K/CU MM (ref 4–10.5)
WBC UA: <1 /HPF (ref 0–2)

## 2020-03-20 PROCEDURE — 80053 COMPREHEN METABOLIC PANEL: CPT

## 2020-03-20 PROCEDURE — 80307 DRUG TEST PRSMV CHEM ANLYZR: CPT

## 2020-03-20 PROCEDURE — G0480 DRUG TEST DEF 1-7 CLASSES: HCPCS

## 2020-03-20 PROCEDURE — 99285 EMERGENCY DEPT VISIT HI MDM: CPT

## 2020-03-20 PROCEDURE — 81001 URINALYSIS AUTO W/SCOPE: CPT

## 2020-03-20 PROCEDURE — 83721 ASSAY OF BLOOD LIPOPROTEIN: CPT

## 2020-03-20 PROCEDURE — 85025 COMPLETE CBC W/AUTO DIFF WBC: CPT

## 2020-03-20 PROCEDURE — 80061 LIPID PANEL: CPT

## 2020-03-20 NOTE — ED NOTES
[facility] in [place], spoke with Jocelin Borges in Admissions, they have open beds. Faxed chart there for review and possible acceptance.      Pastor Hall RN  24/97/43 2474

## 2020-03-20 NOTE — ED PROVIDER NOTES
Triage Chief Complaint:   Mental Health Problem    [place]:  Today in the ED I had the pleasure of caring for Kalin Munoz who is a 27 y.o. male that presents today to the emergency department complaining of paranoia. Context this patient has a history of schizoaffective schizophrenia. States he has been paranoid x2 years. He states over the last several days is gotten significantly worse. He is afraid for offer him. Particularly he leaves that someone who is staying in the hotel (he has been living in a hotel for the last several years) was talking about killing someone and he thinks he may be talking about killing him. He also states that he was on the bus and so was on Bluetooth talking. And he thinks that the patron on the bus was describing patient. Patient denies any fever chills nausea vomiting diarrhea. No headache head chest pain. No auditory visual hallucinations. He denies any flank pain back pain abdominal pain chest pain. No shortness of breath. No recent illness. He has been eating and drinking eliminating baseline. ROS:  REVIEW OF SYSTEMS    At least 10 systems reviewed      All other review of systems are negative  See HPI and nursing notes for additional information       Past Medical History:   Diagnosis Date    Schizo affective schizophrenia [facility])      History reviewed. No pertinent surgical history. History reviewed. No pertinent family history.   Social History     Socioeconomic History    Marital status: Single     Spouse name: Not on file    Number of children: Not on file    Years of education: Not on file    Highest education level: Not on file   Occupational History    Not on file   Social Needs    Financial resource strain: Not on file    Food insecurity     Worry: Not on file     Inability: Not on file    Transportation needs     Medical: Not on file     Non-medical: Not on file   Tobacco Use    Smoking status: Current Every Day Smoker     Packs/day: 0.50 extraocular motors are intact conjunctivae clear sclerae white there is no injection no icterus. Nose without any rhinorrhea or epistaxis. Oral mucosa is moist no exudate buccal mucosa shows no ulcerations. Uvula is midline    Neck: Neck is supple full range of motion trachea midline thyroid nonpalpable  Cardiac: Heart regular rate rhythm no murmurs rubs clicks or gallops  Lungs: Lungs are clear to auscultation there is no wheezing rhonchi or rales. There is no use of accessory muscles no nasal flaring identified. Chest wall: There is no tenderness to palpation over the chest wall or over ribs  Abdomen: Abdomen is soft nontender nondistended. There is no firm or pulsatile masses no rebound rigidity or guarding negative Olivo's negative McBurney, no peritoneal signs  Suprapubic:  there is no tenderness to palpation over the external bladder   Musculoskeletal: 5 out of 5 strength in all 4 extremities full flexion extension abduction and adduction supination pronation of all extremities and all digits. No obvious muscle atrophy is noted. No focal muscle deficits are appreciated  Dermatology: Skin is warm and dry there is no obvious abscesses lacerations or lesions noted  Psych: Mentation is grossly normal cognition is grossly normal. Affect is appropriate  Neuro: Motor intact sensory intact cranial nerves II through XII are intact level of consciousness is normal cerebellar function is normal reflexes are grossly normal. No evidence of incontinence or loss of bowel or bladder no saddle anesthesia noted Lymphatic: There is no submandibular or cervical adenopathy appreciated.         I have reviewed and interpreted all of the currently available lab results from this visit (if applicable):  Results for orders placed or performed during the hospital encounter of 03/20/20   CBC Auto Differential   Result Value Ref Range    WBC 16.0 (H) 4.0 - 10.5 K/CU MM    RBC 5.41 4.6 - 6.2 M/CU MM    Hemoglobin 17.5 13.5 - 18.0 GM/DL tachycardia TECHNOLOGIST PROVIDED HISTORY: Reason for exam:->tachycardia Reason for Exam: tachycardia FINDINGS: The lungs are without acute focal process. There is no effusion or pneumothorax. The cardiomediastinal silhouette is without acute process. The osseous structures are without acute process. No acute process. MDM:   Patient presents today with acute psychiatric process. Will require transfer to inpatient psychiatric facility. Medically cleared. /78   Pulse 75   Temp 98.6 °F (37 °C) (Oral)   Resp 16   Ht 5' 8\" (1.727 m)   Wt 250 lb (113.4 kg)   SpO2 97%   BMI 38.01 kg/m²       Clinical Impression:  1. Paranoia ([address]    2. Schizophrenia, unspecified type ([address]        Disposition referral (if applicable):  No follow-up provider specified. Disposition medications (if applicable):  Discharge Medication List as of 3/20/2020 11:32 AM            Comment: Please note this report has been produced using speech recognition software and may contain errors related to that system including errors in grammar, punctuation, and spelling, as well as words and phrases that may be inappropriate. If there are any questions or concerns please feel free to contact the dictating provider for clarification.       Francisco Cordova, [address] [place] [address]  03/26/20 1724

## 2020-03-20 NOTE — ED NOTES
Patient in bed, no distress noted, sitter at the bedside 1:1 observation continued.       Robert Watts RN  03/20/20 4962

## 2020-03-20 NOTE — ED NOTES
Patient in bed resting comfortably. Breathing is unlabored and no distress noted at this time.       Jaiver Clay RN  03/20/20 9575       Javier Clay RN  03/20/20 5129 [address], RN  03/20/20 9567

## 2020-03-20 NOTE — ED NOTES
In bed with no complaints of voiced. Sitter continues at bedside at this time. Every 15 minute visual checks continue. No further behaviors noted. Denies any needs at this time.       Tano Billings RN  03/20/20 7280

## 2020-03-20 NOTE — ED NOTES
on my questions without changing the subject multiple times. Very preoccupied and quite distracted, has been unable to complete a sentence. Disheveled appearance with very limited self care. Appears unable to manage his own basic, personal needs. Denies suicidal ideation and plan, denies any and all intentions and thoughts of self harm. .  Denies homicidal ideation and plan. Vaguely admits experiencing auditory and visual hallucinations, changes the topic to the man he fears in the room next to his and how he needs protected from others. ..requires ongoing reassurance that he is safe in the ED. . no one would harm him while here. Poor insight and poor judgement is noted throughout my interaction with this patient. Chooses to share no more history with me. Would benefit from involuntary psychiatric admission for observation, evaluation and safety. Maryann Mars Summary:     Patient: As above. Family: No other history is provided. Agency: [facility] in the past with poor follow through. 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 (PABLO De Paz says he treated patient at that time and remembers history of this occurrence,pateint will not permit Lydia Hughes near him.). Protective Factors:    None are identified at the present time. Risk Factors:  Extreme Paranoia    Auditory and Visual Hallucinations  History of Paranoid Schizophrenia  Noncompliant with Treatment and Rx Meds since at least November of 2019. Clinical Summary:    As above.   Documentation shared with Baldev LANDRY, he recommends Involuntary Psychiatric Admission as patient is unable to manage his own, personal basic needs. Will seek placement.     Electronically signed by Du Cuevas RN on 9/74/9586 at 8:24 AM     Du Cuevas RN  91/06/11 3651

## 2020-03-20 NOTE — ED NOTES
[facility] notified of the needs to transport patient to their facility with requests of transport time of 11:30am or 1:30pm.   [place] RN notified at this time.       Venessa Michelle RN  03/20/20 9203

## 2020-03-20 NOTE — ED NOTES
Report given to Jj La at [facility] with  time at 1100.      Sourav Connors RN  03/20/20 0203

## 2020-03-21 LAB
CHOLESTEROL: 179 MG/DL
HDLC SERPL-MCNC: 36 MG/DL
LDL CHOLESTEROL DIRECT: 127 MG/DL
TRIGL SERPL-MCNC: 178 MG/DL

## 2020-11-18 ENCOUNTER — APPOINTMENT (OUTPATIENT)
Dept: CT IMAGING | Age: 25
End: 2020-11-18
Payer: MEDICAID

## 2020-11-18 ENCOUNTER — HOSPITAL ENCOUNTER (EMERGENCY)
Age: 25
Discharge: HOME | End: 2020-11-18
Payer: MEDICAID

## 2020-11-18 VITALS
DIASTOLIC BLOOD PRESSURE: 77 MMHG | TEMPERATURE: 98.6 F | RESPIRATION RATE: 16 BRPM | HEART RATE: 90 BPM | SYSTOLIC BLOOD PRESSURE: 125 MMHG | OXYGEN SATURATION: 95 %

## 2020-11-18 LAB
ALBUMIN SERPL-MCNC: 3.9 GM/DL (ref 3.4–5)
ALP BLD-CCNC: 70 IU/L (ref 40–129)
ALT SERPL-CCNC: 19 U/L (ref 10–40)
ANION GAP SERPL CALCULATED.3IONS-SCNC: 11 MMOL/L (ref 4–16)
AST SERPL-CCNC: 20 IU/L (ref 15–37)
BASOPHILS ABSOLUTE: 0.1 K/CU MM
BASOPHILS RELATIVE PERCENT: 0.4 % (ref 0–1)
BILIRUB SERPL-MCNC: 0.5 MG/DL (ref 0–1)
BUN BLDV-MCNC: 10 MG/DL (ref 6–23)
CALCIUM SERPL-MCNC: 9 MG/DL (ref 8.3–10.6)
CHLORIDE BLD-SCNC: 99 MMOL/L (ref 99–110)
CO2: 27 MMOL/L (ref 21–32)
CREAT SERPL-MCNC: 0.9 MG/DL (ref 0.9–1.3)
DIFFERENTIAL TYPE: ABNORMAL
EOSINOPHILS ABSOLUTE: 0.4 K/CU MM
EOSINOPHILS RELATIVE PERCENT: 2.4 % (ref 0–3)
GFR AFRICAN AMERICAN: >60 ML/MIN/1.73M2
GFR NON-AFRICAN AMERICAN: >60 ML/MIN/1.73M2
GLUCOSE BLD-MCNC: 133 MG/DL (ref 70–99)
HCT VFR BLD CALC: 49.6 % (ref 42–52)
HEMOGLOBIN: 16.6 GM/DL (ref 13.5–18)
IMMATURE NEUTROPHIL %: 0.5 % (ref 0–0.43)
LYMPHOCYTES ABSOLUTE: 3.5 K/CU MM
LYMPHOCYTES RELATIVE PERCENT: 20 % (ref 24–44)
MCH RBC QN AUTO: 32.7 PG (ref 27–31)
MCHC RBC AUTO-ENTMCNC: 33.5 % (ref 32–36)
MCV RBC AUTO: 97.6 FL (ref 78–100)
MONOCYTES ABSOLUTE: 1.1 K/CU MM
MONOCYTES RELATIVE PERCENT: 6.3 % (ref 0–4)
NUCLEATED RBC %: 0 %
PDW BLD-RTO: 12.7 % (ref 11.7–14.9)
PLATELET # BLD: 361 K/CU MM (ref 140–440)
PMV BLD AUTO: 10.4 FL (ref 7.5–11.1)
POTASSIUM SERPL-SCNC: 3.5 MMOL/L (ref 3.5–5.1)
RBC # BLD: 5.08 M/CU MM (ref 4.6–6.2)
SEGMENTED NEUTROPHILS ABSOLUTE COUNT: 12.4 K/CU MM
SEGMENTED NEUTROPHILS RELATIVE PERCENT: 70.4 % (ref 36–66)
SODIUM BLD-SCNC: 137 MMOL/L (ref 135–145)
TOTAL IMMATURE NEUTOROPHIL: 0.08 K/CU MM
TOTAL NUCLEATED RBC: 0 K/CU MM
TOTAL PROTEIN: 7 GM/DL (ref 6.4–8.2)
WBC # BLD: 17.6 K/CU MM (ref 4–10.5)

## 2020-11-18 PROCEDURE — 6360000004 HC RX CONTRAST MEDICATION: Performed by: PHYSICIAN ASSISTANT

## 2020-11-18 PROCEDURE — 96374 THER/PROPH/DIAG INJ IV PUSH: CPT

## 2020-11-18 PROCEDURE — 99285 EMERGENCY DEPT VISIT HI MDM: CPT

## 2020-11-18 PROCEDURE — 70487 CT MAXILLOFACIAL W/DYE: CPT

## 2020-11-18 PROCEDURE — 2580000003 HC RX 258: Performed by: PHYSICIAN ASSISTANT

## 2020-11-18 PROCEDURE — 85025 COMPLETE CBC W/AUTO DIFF WBC: CPT

## 2020-11-18 PROCEDURE — 80053 COMPREHEN METABOLIC PANEL: CPT

## 2020-11-18 PROCEDURE — 6360000002 HC RX W HCPCS: Performed by: PHYSICIAN ASSISTANT

## 2020-11-18 RX ORDER — CLINDAMYCIN HYDROCHLORIDE 150 MG/1
450 CAPSULE ORAL 3 TIMES DAILY
Qty: 90 CAPSULE | Refills: 0 | Status: SHIPPED | OUTPATIENT
Start: 2020-11-18 | End: 2020-11-28

## 2020-11-18 RX ORDER — IBUPROFEN 600 MG/1
600 TABLET ORAL EVERY 6 HOURS PRN
Qty: 30 TABLET | Refills: 0 | Status: SHIPPED | OUTPATIENT
Start: 2020-11-18 | End: 2022-06-03

## 2020-11-18 RX ORDER — 0.9 % SODIUM CHLORIDE 0.9 %
1000 INTRAVENOUS SOLUTION INTRAVENOUS ONCE
Status: COMPLETED | OUTPATIENT
Start: 2020-11-18 | End: 2020-11-18

## 2020-11-18 RX ORDER — KETOROLAC TROMETHAMINE 30 MG/ML
30 INJECTION, SOLUTION INTRAMUSCULAR; INTRAVENOUS ONCE
Status: COMPLETED | OUTPATIENT
Start: 2020-11-18 | End: 2020-11-18

## 2020-11-18 RX ADMIN — SODIUM CHLORIDE 1000 ML: 9 INJECTION, SOLUTION INTRAVENOUS at 08:11

## 2020-11-18 RX ADMIN — KETOROLAC TROMETHAMINE 30 MG: 30 INJECTION, SOLUTION INTRAMUSCULAR; INTRAVENOUS at 08:10

## 2020-11-18 RX ADMIN — IOPAMIDOL 80 ML: 755 INJECTION, SOLUTION INTRAVENOUS at 10:07

## 2020-11-18 ASSESSMENT — PAIN SCALES - GENERAL
PAINLEVEL_OUTOF10: 5
PAINLEVEL_OUTOF10: 8
PAINLEVEL_OUTOF10: 8

## 2020-11-18 ASSESSMENT — PAIN DESCRIPTION - LOCATION: LOCATION: TEETH

## 2020-11-18 ASSESSMENT — PAIN DESCRIPTION - ORIENTATION: ORIENTATION: RIGHT;UPPER

## 2020-11-18 ASSESSMENT — PAIN DESCRIPTION - PAIN TYPE: TYPE: ACUTE PAIN

## 2020-11-18 ASSESSMENT — PAIN DESCRIPTION - DESCRIPTORS: DESCRIPTORS: THROBBING

## 2020-11-18 NOTE — ED PROVIDER NOTES
eMERGENCY dEPARTMENT eNCOUnter      PCP: Michelet Vilchis MD    CHIEF COMPLAINT    Chief Complaint   Patient presents with    Dental Pain       HPI    Gabby Leyva is a 27 y.o. male who presents with right upper dental pain. He has been having symptoms for about the past month, states that the swelling to the right side of face began over the past several days. He states he was supposed to have a dental extraction, however missed this appointment and has not been rescheduled for another 2 to 3 weeks. He has not been on any antibiotics over the past month, has not tried anything for pain today. Pain worsens with eating. Patient states he is also having pain and swelling of right cheek. No associated ear pain. No fevers. REVIEW OF SYSTEMS    General: Denies Fever, Denies Chills, Denies syncope  ENT:  No tongue or lip swelling, No difficulty swallowing,   Respiratory: Denies difficulty breathing, wheezes. GI: Denies nausea, vomiting. Lymphatics:  No swollen nodes, red streaks  Skin:  See hpi. No rash. All other review of systems are negative  See HPI and nursing notes for additional information     PAST MEDICAL & SURGICAL HISTORY    Past Medical History:   Diagnosis Date    Schizo affective schizophrenia [facility])      History reviewed. No pertinent surgical history. CURRENT MEDICATIONS    Current Outpatient Rx   Medication Sig Dispense Refill    clindamycin (CLEOCIN) 150 MG capsule Take 3 capsules by mouth 3 times daily for 10 days 90 capsule 0    ibuprofen (ADVIL;MOTRIN) 600 MG tablet Take 1 tablet by mouth every 6 hours as needed for Pain 30 tablet 0    acetaminophen-codeine (TYLENOL/CODEINE #3) 300-30 MG per tablet Take 1 tablet by mouth every 4 hours as needed for 20 doses.  20 tablet 0       ALLERGIES    No Known Allergies    SOCIAL & FAMILY HISTORY    Social History     Socioeconomic History    Marital status: Single     Spouse name: None    Number of children: None    Years of education: None    Highest education level: None   Occupational History    None   Social Needs    Financial resource strain: None    Food insecurity     Worry: None     Inability: None    Transportation needs     Medical: None     Non-medical: None   Tobacco Use    Smoking status: Current Every Day Smoker     Packs/day: 0.50     Types: Cigarettes    Smokeless tobacco: Never Used   Substance and Sexual Activity    Alcohol use: Yes     Comment: occasional    Drug use: No    Sexual activity: Yes     Partners: Female   Lifestyle    Physical activity     Days per week: None     Minutes per session: None    Stress: None   Relationships    Social connections     Talks on phone: None     Gets together: None     Attends Scientology service: None     Active member of club or organization: None     Attends meetings of clubs or organizations: None     Relationship status: None    Intimate partner violence     Fear of current or ex partner: None     Emotionally abused: None     Physically abused: None     Forced sexual activity: None   Other Topics Concern    None   Social History Narrative    None     History reviewed. No pertinent family history. PHYSICAL EXAM    VITAL SIGNS: /68   Pulse 90   Temp 98.6 °F (37 °C) (Oral)   Resp 16   SpO2 95%    Constitutional:  Well developed, well nourished, no acute distress   HENT:  Atraumatic, moist mucus membranes. EOMI. No proptosis. No conjunctival injection. Ear canals and TMs clear bilateral.  Neck/Lymphatics: supple, no JVD, no swollen nodes   Musculoskeletal:  No edema, no deformities  Integument:  Skin warm and dry, no petechiae   Neurologic:  Alert & oriented, no slurred speech  Psych: Pleasant affect, no hallucinations    Dental:   Swelling and redness noted of right maxillary sinus, mild tenderness palpation of this area. No trismus. Right Upper molars without significant evidence of caries, decay.   Tenderness to palpation of right upper premolar and along the gingiva. Gingival buccal interface without obvious mass or discoloration, or fluctuance to palpation. No sublingual swelling or masses   No submandibular swelling. Oropharynx:    Posterior pharynx without swelling, tonsillar hypertrophy. Uvula is midline and rises with phonation. No sublingual swelling. Results for orders placed or performed during the hospital encounter of 11/18/20   CBC auto diff   Result Value Ref Range    WBC 17.6 (H) 4.0 - 10.5 K/CU MM    RBC 5.08 4.6 - 6.2 M/CU MM    Hemoglobin 16.6 13.5 - 18.0 GM/DL    Hematocrit 49.6 42 - 52 %    MCV 97.6 78 - 100 FL    MCH 32.7 (H) 27 - 31 PG    MCHC 33.5 32.0 - 36.0 %    RDW 12.7 11.7 - 14.9 %    Platelets 928 145 - 457 K/CU MM    MPV 10.4 7.5 - 11.1 FL    Differential Type AUTOMATED DIFFERENTIAL     Segs Relative 70.4 (H) 36 - 66 %    Lymphocytes % 20.0 (L) 24 - 44 %    Monocytes % 6.3 (H) 0 - 4 %    Eosinophils % 2.4 0 - 3 %    Basophils % 0.4 0 - 1 %    Segs Absolute 12.4 K/CU MM    Lymphocytes Absolute 3.5 K/CU MM    Monocytes Absolute 1.1 K/CU MM    Eosinophils Absolute 0.4 K/CU MM    Basophils Absolute 0.1 K/CU MM    Nucleated RBC % 0.0 %    Total Nucleated RBC 0.0 K/CU MM    Total Immature Neutrophil 0.08 K/CU MM    Immature Neutrophil % 0.5 (H) 0 - 0.43 %   CMP   Result Value Ref Range    Sodium 137 135 - 145 MMOL/L    Potassium 3.5 3.5 - 5.1 MMOL/L    Chloride 99 99 - 110 mMol/L    CO2 27 21 - 32 MMOL/L    BUN 10 6 - 23 MG/DL    CREATININE 0.9 0.9 - 1.3 MG/DL    Glucose 133 (H) 70 - 99 MG/DL    Calcium 9.0 8.3 - 10.6 MG/DL    Alb 3.9 3.4 - 5.0 GM/DL    Total Protein 7.0 6.4 - 8.2 GM/DL    Total Bilirubin 0.5 0.0 - 1.0 MG/DL    ALT 19 10 - 40 U/L    AST 20 15 - 37 IU/L    Alkaline Phosphatase 70 40 - 129 IU/L    GFR Non-African American >60 >60 mL/min/1.73m2    GFR African American >60 >60 mL/min/1.73m2    Anion Gap 11 4 - 16        CT FACIAL BONES W CONTRAST   Final Result   1.  Soft tissue swelling on the right side of the face.  No abnormal fluid   collections seen to suggest an abscess   2. No bony destruction   3. Sinus disease with partial opacification of the left frontal sinus               ED COURSE & MEDICAL DECISION MAKING       Vital signs and nursing notes reviewed during ED course. I have independently evaluated this patient. Supervising MD present in the Emergency Department, available for consultation, throughout entirety of patient care. Patient presents as above with right upper dental pain for about a month. He has associated facial redness and swelling of right maxillary region. On arrival he is afebrile. He is hypertensive and mildly tachycardic. Lab work with leukocytosis. CT facial bones demonstrate soft tissue swelling of the right side of face. No abnormal fluid collection seen to suggest an abscess. No bony destruction. Sinus disease with partial opacification of left frontal sinus noted. Subsequent evaluation, patient resting comfortably, pulse within normal limits, continues to be afebrile. We will plan on treating for likely dental infection as etiology of symptoms with clindamycin and will discharge ibuprofen. Encourage close follow-up with dentist as soon as possible and he agrees with this plan, does have an appointment scheduled. We discussed immediately returning with any new or worsening symptoms. At this time, low suspicion for septicemia. All pertinent Lab data and radiographic results reviewed with patient at bedside. The patient and/or the family were informed of the results of any tests/labs/imaging, the treatment plan, and time was allotted to answer questions. Diagnosis, disposition, and plan reviewed with patient today. Patient is comfortable with discharge at this time. Again, patient agrees to follow up with dentist as soon as possible.  Return to emergency department warning signs discussed in detail with patient who understands and agrees to returning for any new or worsening signs or symptoms, including but not limited to difficulty swallowing, increased jaw swelling, fever, increased pain. Clinical  IMPRESSION    1. Dental infection    2. Facial swelling              Comment: Please note this report has been produced using speech recognition software and may contain errors related to that system including errors in grammar, punctuation, and spelling, as well as words and phrases that may be inappropriate. If there are any questions or concerns please feel free to contact the dictating provider for clarification.           PABLO Caballero  11/18/20 2316

## 2020-11-18 NOTE — ED NOTES
Pt. States that he was planning to have the problem tooth pulled but missed the appointment and he has an appointment in 2 weeks but was scared of infection. Pt.s right upper cheek is swollen and warm. Area is sensitive to touch.  Pt. States trouble eating like normal.     Monty Maxwell  11/18/20 0730

## 2021-03-25 ENCOUNTER — HOSPITAL ENCOUNTER (EMERGENCY)
Age: 26
Discharge: HOME | End: 2021-03-25
Payer: MEDICAID

## 2021-03-25 VITALS
SYSTOLIC BLOOD PRESSURE: 132 MMHG | WEIGHT: 230 LBS | RESPIRATION RATE: 15 BRPM | HEART RATE: 89 BPM | TEMPERATURE: 98.4 F | HEIGHT: 69 IN | DIASTOLIC BLOOD PRESSURE: 75 MMHG | BODY MASS INDEX: 34.07 KG/M2 | OXYGEN SATURATION: 97 %

## 2021-03-25 DIAGNOSIS — K04.7 DENTAL INFECTION: Primary | ICD-10-CM

## 2021-03-25 PROCEDURE — 99283 EMERGENCY DEPT VISIT LOW MDM: CPT

## 2021-03-25 PROCEDURE — 6370000000 HC RX 637 (ALT 250 FOR IP): Performed by: PHYSICIAN ASSISTANT

## 2021-03-25 RX ORDER — AMOXICILLIN 500 MG/1
500 CAPSULE ORAL 3 TIMES DAILY
Qty: 30 CAPSULE | Refills: 0 | Status: SHIPPED | OUTPATIENT
Start: 2021-03-25 | End: 2021-04-04

## 2021-03-25 RX ORDER — AMOXICILLIN 250 MG/1
500 CAPSULE ORAL ONCE
Status: COMPLETED | OUTPATIENT
Start: 2021-03-25 | End: 2021-03-25

## 2021-03-25 RX ADMIN — AMOXICILLIN 500 MG: 250 CAPSULE ORAL at 08:51

## 2021-03-25 ASSESSMENT — PAIN DESCRIPTION - ORIENTATION: ORIENTATION: RIGHT;UPPER

## 2021-03-25 ASSESSMENT — PAIN DESCRIPTION - FREQUENCY: FREQUENCY: CONTINUOUS

## 2021-03-25 ASSESSMENT — PAIN DESCRIPTION - PROGRESSION: CLINICAL_PROGRESSION: GRADUALLY WORSENING

## 2021-03-25 ASSESSMENT — PAIN DESCRIPTION - ONSET: ONSET: GRADUAL

## 2021-03-25 ASSESSMENT — PAIN DESCRIPTION - PAIN TYPE: TYPE: ACUTE PAIN

## 2021-03-25 NOTE — ED PROVIDER NOTES
eMERGENCY dEPARTMENT eNCOUnter      CHIEF COMPLAINT    No chief complaint on file. HPI    Huong Null is a 23 y.o. male who presents with dental pain. Onset was 3 days ago. Context was patient had dental cavity drilled but not filled by dentist 2 weeks ago, is scheduled to have further procedure (unknown exactly what) performed in mid April. Localized to the right upper area of the mouth. Constant since onset. Pain is characterized as throbbing. Worse with chewing, alleviated with nothing. Patient reports radiation to nowhere. Severity is a 8 on a scale of 0-10. Patient denies any fever, headache, chest pain, sob, nausea, vomiting. REVIEW OF SYSTEMS    Constitutional:  Denies fever, chills. HENT:  Denies headache, neck pain, ear pain. Dentition:  + dental pain  Respiratory:  Denies any shortness of breath, cough. Cardiovascular:  Denies chest pain, syncope. GI:  Denies nausea, vomiting. PAST MEDICAL & SURGICAL HISTORY    Past Medical History:   Diagnosis Date    Schizo affective schizophrenia ([facility] [address]      No past surgical history on file. CURRENT MEDICATIONS    Current Outpatient Rx   Medication Sig Dispense Refill    amoxicillin (AMOXIL) 500 MG capsule Take 1 capsule by mouth 3 times daily for 10 days 30 capsule 0    ibuprofen (ADVIL;MOTRIN) 600 MG tablet Take 1 tablet by mouth every 6 hours as needed for Pain 30 tablet 0    acetaminophen-codeine (TYLENOL/CODEINE #3) 300-30 MG per tablet Take 1 tablet by mouth every 4 hours as needed for 20 doses.  20 tablet 0         ALLERGIES    No Known Allergies      SOCIAL & FAMILY HISTORY    Social History     Socioeconomic History    Marital status: Single     Spouse name: Not on file    Number of children: Not on file    Years of education: Not on file    Highest education level: Not on file   Occupational History    Not on file   Social Needs    Financial resource strain: Not on file    Food insecurity     Worry: Not on file     Inability: Not on file    Transportation needs     Medical: Not on file     Non-medical: Not on file   Tobacco Use    Smoking status: Current Every Day Smoker     Packs/day: 0.50     Types: Cigarettes    Smokeless tobacco: Never Used   Substance and Sexual Activity    Alcohol use: Yes     Comment: occasional    Drug use: No    Sexual activity: Yes     Partners: Female   Lifestyle    Physical activity     Days per week: Not on file     Minutes per session: Not on file    Stress: Not on file   Relationships    Social connections     Talks on phone: Not on file     Gets together: Not on file     Attends Lutheran service: Not on file     Active member of club or organization: Not on file     Attends meetings of clubs or organizations: Not on file     Relationship status: Not on file    Intimate partner violence     Fear of current or ex partner: Not on file     Emotionally abused: Not on file     Physically abused: Not on file     Forced sexual activity: Not on file   Other Topics Concern    Not on file   Social History Narrative    Not on file     No family history on file. PHYSICAL EXAM    VITAL SIGNS: BP (!) 150/76   Pulse 108   Temp 98.4 °F (36.9 °C) (Oral)   Resp 16   Ht 5' 9\" (1.753 m)   Wt 230 lb (104.3 kg)   SpO2 96%   BMI 33.97 kg/m²    Constitutional:  Well nourished, no acute distress   HENT:  NC/AT. Ears, nose normal.  Oropharynx moist, uvula midline, no trismus. Dentition: Overall dentition is good. There is a small hole noted in the posterior aspect of the right upper first premolar with tenderness to palpation of tooth as well as multiple erythematous regions of the buccal gingiva without edema or john abscess noted. Remainder of dentition is unremarkable. No sublingual tenderness to palpation or swelling. Neck:  Supple, no swelling. Respiratory:  Normal respiratory effort. Cardiovascular:  RRR. Integument:  Well hydrated, no rashes.   Neurologic:  Alert & oriented x 3. No focal deficits. PROCEDURES      ED COURSE & MEDICAL DECISION MAKING    Pertinent Labs & Imaging studies reviewed and interpreted. (See chart for details)  -  Patient seen and evaluated in the emergency department. -  Triage and nursing notes reviewed and incorporated. -  Old chart records reviewed and incorporated. -  I have independently evaluated this patient. -  Differential diagnosis includes: dental caries, dental abscess, Lisy's Angina, retropharyngeal abscess, bacterial meningitis, airway obstruction, and others  -  Work-up included:  -  Patient treated with amoxicillin in the ED.  -  Patient discharged home. Provided dental referral sheet, history is strange for partially performed procedure by dentist, unclear if patient is not understanding of what occurred or if something occurred during procedure that it needed to be stopped in the middle. Recommended earlier dental follow-up if possible. I estimate there is LOW risk for DEEP SPACE INFECTION (e.g., LISYS ANGINA OR RETROPHARYNGEAL ABSCESS), BACTERIAL MENINGITIS, or AIRWAY COMPROMISE, thus I consider the discharge disposition reasonable. Discharge prescriptions for Amoxicillin provided and patient instructed in their use. Follow-up with dentist in 2-3 days. Return to the Emergency Department immediately if new or worsening symptoms occur. We have discussed the symptoms which are most concerning that necessitate immediate return, including worsening pain, difficulty swallowing, fever, chills, difficulty breathing, etc.  Patient agreeable with plan of care and disposition. FINAL IMPRESSION    1. Dental infection        Blood pressure (!) 150/76, pulse 108, temperature 98.4 °F (36.9 °C), temperature source Oral, resp. rate 16, height 5' 9\" (1.753 m), weight 230 lb (104.3 kg), SpO2 96 %.        (Please note that this note was completed with a voice recognition program.  Every attempt was made to edit the dictations, but inevitably there remain words that are mis-transcribed. Rowan Handler GRIS Freeman  03/25/21 9964

## 2022-06-03 ENCOUNTER — HOSPITAL ENCOUNTER (EMERGENCY)
Age: 27
Discharge: HOME | End: 2022-06-03
Payer: MEDICAID

## 2022-06-03 VITALS
RESPIRATION RATE: 18 BRPM | OXYGEN SATURATION: 95 % | DIASTOLIC BLOOD PRESSURE: 128 MMHG | WEIGHT: 230 LBS | HEART RATE: 91 BPM | SYSTOLIC BLOOD PRESSURE: 140 MMHG | TEMPERATURE: 98.1 F | BODY MASS INDEX: 34.86 KG/M2 | HEIGHT: 68 IN

## 2022-06-03 DIAGNOSIS — M54.50 LEFT LUMBAR PAIN: Primary | ICD-10-CM

## 2022-06-03 PROCEDURE — 99283 EMERGENCY DEPT VISIT LOW MDM: CPT

## 2022-06-03 RX ORDER — LIDOCAINE 50 MG/G
1 PATCH TOPICAL DAILY
Qty: 10 PATCH | Refills: 0 | Status: SHIPPED | OUTPATIENT
Start: 2022-06-03 | End: 2022-06-13

## 2022-06-03 RX ORDER — IBUPROFEN 600 MG/1
600 TABLET ORAL 4 TIMES DAILY PRN
Qty: 40 TABLET | Refills: 0 | Status: SHIPPED | OUTPATIENT
Start: 2022-06-03

## 2022-06-03 RX ORDER — TIZANIDINE 2 MG/1
2 TABLET ORAL 3 TIMES DAILY PRN
Qty: 15 TABLET | Refills: 0 | Status: SHIPPED | OUTPATIENT
Start: 2022-06-03

## 2022-06-03 ASSESSMENT — PAIN DESCRIPTION - DESCRIPTORS: DESCRIPTORS: SHARP

## 2022-06-03 ASSESSMENT — PAIN DESCRIPTION - PAIN TYPE: TYPE: ACUTE PAIN

## 2022-06-03 ASSESSMENT — PAIN DESCRIPTION - LOCATION
LOCATION: BACK
LOCATION: BACK

## 2022-06-03 ASSESSMENT — PAIN SCALES - GENERAL
PAINLEVEL_OUTOF10: 7
PAINLEVEL_OUTOF10: 7

## 2022-06-03 ASSESSMENT — PAIN DESCRIPTION - FREQUENCY: FREQUENCY: CONTINUOUS

## 2022-06-03 ASSESSMENT — PAIN - FUNCTIONAL ASSESSMENT: PAIN_FUNCTIONAL_ASSESSMENT: 0-10

## 2022-06-03 NOTE — Clinical Note
Hallie Ravi was seen and treated in our emergency department on 6/3/2022. He may return to work on 06/04/2022. If you have any questions or concerns, please don't hesitate to call.       Anibal Simms PA-C

## 2022-06-03 NOTE — ED PROVIDER NOTES
**ADVANCED PRACTICE PROVIDER, I HAVE EVALUATED THIS PATIENT**        [address] ENCOUNTER      Pt Name: Candy Slaughter  ADM:[id number]  Antonio 1995  Date of evaluation: 6/3/2022  Provider: Luisana Mi PA-C      Chief Complaint:    Chief Complaint   Patient presents with    Back Pain     chronic back pain, recent injury, patient states pain radiates to the hips         Nursing Notes, Past Medical Hx, Past Surgical Hx, Social Hx, Allergies, and Family Hx were all reviewed and agreed with or any disagreements were addressed in the HPI.    HPI: (Location, Duration, Timing, Severity, Quality, Assoc Sx, Context, Modifying factors)    Chief Complaint of back pain    This is a  27 y.o. male who presents complaint of back pain x1 week. He mentions that this might be related to history of falls. He mentions that he had fallen years ago in [place] from a height, and describes some extremity fractures and hospital stay. But however he had recovered and was at his normal health, with no chronic conditions, but had fallen 12 weeks ago. This time he describes falling off of a 12 foot ladder. He had not seek any medical evaluation at that time. He states he has had some persisting pain over the past week with no improvement after Tylenol and ibuprofen. He denies any fever, abdominal pain, loss of consciousness, extremity pain, numbness tingling weakness, bowel or bladder symptoms. PastMedical/Surgical History:      Diagnosis Date    Schizo affective schizophrenia [facility])      History reviewed. No pertinent surgical history. Medications:  Discharge Medication List as of 6/3/2022 10:24 AM      CONTINUE these medications which have NOT CHANGED    Details   acetaminophen-codeine (TYLENOL/CODEINE #3) 300-30 MG per tablet Take 1 tablet by mouth every 4 hours as needed for 20 doses. , Disp-20 tablet, R-0               Review of Systems:  (2-9 systems needed)  Review of Systems   Constitutional: Negative for chills and fever. HENT: Negative for congestion and rhinorrhea. Eyes: Negative for pain and visual disturbance. Respiratory: Negative for cough and shortness of breath. Cardiovascular: Negative for chest pain and leg swelling. Gastrointestinal: Negative for abdominal pain, diarrhea, nausea and vomiting. Genitourinary: Negative for dysuria and hematuria. Musculoskeletal: Positive for back pain. Negative for myalgias. Skin: Negative for rash and wound. Neurological: Negative for dizziness and light-headedness. \"Positives and Pertinent negatives as per HPI\"    Physical Exam:  Physical Exam  Vitals and nursing note reviewed. Constitutional:       Appearance: Normal appearance. He is well-developed. He is not ill-appearing or diaphoretic. HENT:      Head: Normocephalic and atraumatic. Eyes:      General: No scleral icterus. Right eye: No discharge. Left eye: No discharge. Cardiovascular:      Rate and Rhythm: Normal rate and regular rhythm. Heart sounds: Normal heart sounds. No murmur heard. No friction rub. No gallop. Pulmonary:      Effort: Pulmonary effort is normal. No respiratory distress. Breath sounds: Normal breath sounds. No stridor. No wheezing or rales. Chest:      Chest wall: No tenderness. Abdominal:      General: Bowel sounds are normal. There is no distension. Palpations: Abdomen is soft. There is no mass. Tenderness: There is no abdominal tenderness. There is no guarding or rebound. Musculoskeletal:         General: No tenderness. Normal range of motion. Cervical back: Normal range of motion and neck supple. Skin:     General: Skin is warm and dry. Coloration: Skin is not pale. Neurological:      Mental Status: He is alert and oriented to person, place, and time.       Coordination: Coordination normal.   Psychiatric:         Mood and and outpatient follow up for for further management if no improvement. He agreed to this plan. I have low suspicious for spinal cord or cauda equina compression, malignancy, spinal abscess, discitis, osteomyelitis, spinal stenosis, aaa , UTI,     The patient tolerated their visit well. I evaluated the patient. The physician was available for consultation as needed. The patient and / or the family were informed of the results of any tests, a time was given to answer questions, a plan was proposed and they agreed with plan. CLINICAL IMPRESSION:  1.  Left lumbar pain        DISPOSITION Decision To Discharge 06/03/2022 09:47:29 AM      PATIENT REFERRED TO:  Toby Hutchinson MD  [phone]            DISCHARGE MEDICATIONS:  Discharge Medication List as of 6/3/2022 10:24 AM      START taking these medications    Details   tiZANidine (ZANAFLEX) 2 MG tablet Take 1 tablet by mouth 3 times daily as needed (pain), Disp-15 tablet, R-0Normal      lidocaine (LIDODERM) 5 % Place 1 patch onto the skin daily for 10 days 12 hours on, 12 hours off., Disp-10 patch, R-0Normal             DISCONTINUED MEDICATIONS:  Discharge Medication List as of 6/3/2022 10:24 AM                 (Please note the MDM and HPI sections of this note were completed with a voice recognition program.  Efforts were made to edit the dictations but occasionally words are mis-transcribed.)    Electronically signed, Fly Mack PA-C,           Fly Mack PA-C  06/05/22 [id number]

## 2022-06-04 ASSESSMENT — ENCOUNTER SYMPTOMS
ABDOMINAL PAIN: 0
RHINORRHEA: 0
SHORTNESS OF BREATH: 0
VOMITING: 0
EYE PAIN: 0
BACK PAIN: 1
DIARRHEA: 0
NAUSEA: 0
COUGH: 0

## 2024-03-12 ENCOUNTER — HOSPITAL ENCOUNTER (EMERGENCY)
Age: 29
Discharge: LEFT BEFORE DISCHARGE | End: 2024-03-12

## 2024-03-12 VITALS
DIASTOLIC BLOOD PRESSURE: 79 MMHG | TEMPERATURE: 98.5 F | HEART RATE: 78 BPM | RESPIRATION RATE: 18 BRPM | SYSTOLIC BLOOD PRESSURE: 156 MMHG | OXYGEN SATURATION: 99 %

## 2025-06-27 ENCOUNTER — HOSPITAL ENCOUNTER (EMERGENCY)
Age: 30
Discharge: HOME | End: 2025-06-27
Attending: STUDENT IN AN ORGANIZED HEALTH CARE EDUCATION/TRAINING PROGRAM
Payer: MEDICAID

## 2025-06-27 VITALS
OXYGEN SATURATION: 96 % | HEART RATE: 88 BPM | BODY MASS INDEX: 36.37 KG/M2 | WEIGHT: 240 LBS | RESPIRATION RATE: 17 BRPM | DIASTOLIC BLOOD PRESSURE: 87 MMHG | TEMPERATURE: 98.9 F | HEIGHT: 68 IN | SYSTOLIC BLOOD PRESSURE: 151 MMHG

## 2025-06-27 DIAGNOSIS — B34.9 VIRAL SYNDROME: Primary | ICD-10-CM

## 2025-06-27 LAB
INFLUENZA A BY PCR: NOT DETECTED
INFLUENZA B BY PCR: NOT DETECTED
SARS-COV-2 RDRP RESP QL NAA+PROBE: NOT DETECTED
SPECIMEN DESCRIPTION: NORMAL

## 2025-06-27 PROCEDURE — 6370000000 HC RX 637 (ALT 250 FOR IP): Performed by: STUDENT IN AN ORGANIZED HEALTH CARE EDUCATION/TRAINING PROGRAM

## 2025-06-27 PROCEDURE — 87635 SARS-COV-2 COVID-19 AMP PRB: CPT

## 2025-06-27 PROCEDURE — 87502 INFLUENZA DNA AMP PROBE: CPT

## 2025-06-27 PROCEDURE — 99283 EMERGENCY DEPT VISIT LOW MDM: CPT

## 2025-06-27 RX ORDER — ACETAMINOPHEN 500 MG
1000 TABLET ORAL
Status: COMPLETED | OUTPATIENT
Start: 2025-06-27 | End: 2025-06-27

## 2025-06-27 RX ADMIN — ACETAMINOPHEN 1000 MG: 500 TABLET ORAL at 06:39

## 2025-06-27 ASSESSMENT — PAIN - FUNCTIONAL ASSESSMENT
PAIN_FUNCTIONAL_ASSESSMENT: ACTIVITIES ARE NOT PREVENTED
PAIN_FUNCTIONAL_ASSESSMENT: 0-10

## 2025-06-27 ASSESSMENT — PAIN DESCRIPTION - DESCRIPTORS
DESCRIPTORS: ACHING
DESCRIPTORS: ACHING

## 2025-06-27 ASSESSMENT — PAIN DESCRIPTION - ORIENTATION: ORIENTATION: LEFT

## 2025-06-27 ASSESSMENT — LIFESTYLE VARIABLES
HOW OFTEN DO YOU HAVE A DRINK CONTAINING ALCOHOL: NEVER
HOW MANY STANDARD DRINKS CONTAINING ALCOHOL DO YOU HAVE ON A TYPICAL DAY: PATIENT DOES NOT DRINK

## 2025-06-27 ASSESSMENT — PAIN SCALES - GENERAL
PAINLEVEL_OUTOF10: 6
PAINLEVEL_OUTOF10: 6

## 2025-06-27 ASSESSMENT — PAIN DESCRIPTION - LOCATION
LOCATION: HEAD
LOCATION: HEAD

## 2025-06-27 NOTE — ED PROVIDER NOTES
Emergency Department Encounter      Patient: Jas Callahan  MRN: [id number]  : 1995  Date of Evaluation: 2025  PCP: Soham Dave MD  ED Provider:  Brent Rendon DO    Triage Chief Complaint:    Concern For COVID-19 and Headache    HPI:   Jas Callahan is a 29 y.o. male that presents to the emergency department for upper respiratory-like symptoms.  Patient declines any known past medical history.  Patient states over the past couple of days he has had nasal congestion, sinus pressure with a mild headache.  He states today he started to notice loss of smell and taste which was concerning for underlying COVID-19 which prompted his visit.  He declines any associated fevers or chills.  No neck pain or stiffness.  Declines any ear pain or sore throat.  Tolerating p.o. intake without difficulty.  No chest pain or shortness of breath no further complaints at this time        History from : Patient    Limitations to history : None    MDM/ED Course:       In brief,     Pleasant 29-year-old male presenting to the emergency department as above.  Arrives 1 day hypertensive but otherwise hemodynamically stable.  Emergent conditions considered.  Overall, his exam is reassuring, based off of his symptomatology, suspecting likely viral process.  Based off of length of symptoms and lack of systemic symptoms low suspicion for bacterial process and at this point do not believe antibiotics are warranted.  I have low suspicion for meningitis encephalitis subarachnoid hemorrhage.  Do not believe imaging of the head is warranted.  COVID and influenza testing is negative.  Will treat supportively and recommend close outpatient follow-up strict return precautions           EKG (if obtained): (All EKG's are interpreted by myself in the absence of a cardiologist) none        Physical Exam:   Patient Vitals for the past 24 hrs:   BP Temp Temp src Pulse Resp SpO2 Height Weight   25 0552 (!) 151/87 98.9 °F